# Patient Record
Sex: MALE | URBAN - METROPOLITAN AREA
[De-identification: names, ages, dates, MRNs, and addresses within clinical notes are randomized per-mention and may not be internally consistent; named-entity substitution may affect disease eponyms.]

---

## 2020-03-09 ENCOUNTER — HOSPITAL ENCOUNTER (OUTPATIENT)
Age: 65
Setting detail: SPECIMEN
Discharge: HOME | End: 2020-03-09
Payer: COMMERCIAL

## 2020-03-13 LAB — SURGICAL PATHOLOGY REPORT: NORMAL

## 2024-03-20 ENCOUNTER — ANESTHESIA EVENT (OUTPATIENT)
Dept: OPERATING ROOM | Age: 69
End: 2024-03-20
Payer: COMMERCIAL

## 2024-03-20 ENCOUNTER — HOSPITAL ENCOUNTER (OUTPATIENT)
Age: 69
Setting detail: OUTPATIENT SURGERY
Discharge: HOME | End: 2024-03-20
Attending: SURGERY | Admitting: SURGERY
Payer: COMMERCIAL

## 2024-03-20 ENCOUNTER — ANESTHESIA (OUTPATIENT)
Dept: OPERATING ROOM | Age: 69
End: 2024-03-20
Payer: COMMERCIAL

## 2024-03-20 VITALS
HEART RATE: 56 BPM | BODY MASS INDEX: 30.22 KG/M2 | RESPIRATION RATE: 11 BRPM | OXYGEN SATURATION: 98 % | TEMPERATURE: 96.8 F | WEIGHT: 188 LBS | SYSTOLIC BLOOD PRESSURE: 110 MMHG | HEIGHT: 66 IN | DIASTOLIC BLOOD PRESSURE: 67 MMHG

## 2024-03-20 DIAGNOSIS — Z12.11 SCREEN FOR COLON CANCER: ICD-10-CM

## 2024-03-20 PROBLEM — I10 PRIMARY HYPERTENSION: Status: ACTIVE | Noted: 2024-03-20

## 2024-03-20 PROBLEM — Z86.010 HISTORY OF COLON POLYPS: Status: ACTIVE | Noted: 2024-03-20

## 2024-03-20 LAB — GLUCOSE BLD-MCNC: 94 MG/DL (ref 75–110)

## 2024-03-20 PROCEDURE — 2709999900 HC NON-CHARGEABLE SUPPLY: Performed by: SURGERY

## 2024-03-20 PROCEDURE — 3700000000 HC ANESTHESIA ATTENDED CARE: Performed by: SURGERY

## 2024-03-20 PROCEDURE — 2500000003 HC RX 250 WO HCPCS: Performed by: SPECIALIST

## 2024-03-20 PROCEDURE — 2580000003 HC RX 258: Performed by: ANESTHESIOLOGY

## 2024-03-20 PROCEDURE — 6360000002 HC RX W HCPCS: Performed by: ANESTHESIOLOGY

## 2024-03-20 PROCEDURE — C1889 IMPLANT/INSERT DEVICE, NOC: HCPCS | Performed by: SURGERY

## 2024-03-20 PROCEDURE — 3609027000 HC COLONOSCOPY: Performed by: SURGERY

## 2024-03-20 PROCEDURE — 82947 ASSAY GLUCOSE BLOOD QUANT: CPT

## 2024-03-20 PROCEDURE — 6360000002 HC RX W HCPCS: Performed by: SPECIALIST

## 2024-03-20 PROCEDURE — 3700000001 HC ADD 15 MINUTES (ANESTHESIA): Performed by: SURGERY

## 2024-03-20 PROCEDURE — 88305 TISSUE EXAM BY PATHOLOGIST: CPT

## 2024-03-20 PROCEDURE — 7100000010 HC PHASE II RECOVERY - FIRST 15 MIN: Performed by: SURGERY

## 2024-03-20 PROCEDURE — 7100000011 HC PHASE II RECOVERY - ADDTL 15 MIN: Performed by: SURGERY

## 2024-03-20 DEVICE — WORKING LENGTH 235CM, WORKING CHANNEL 2.8MM
Type: IMPLANTABLE DEVICE | Status: FUNCTIONAL
Brand: RESOLUTION 360 CLIP

## 2024-03-20 RX ORDER — SODIUM CHLORIDE 9 MG/ML
INJECTION, SOLUTION INTRAVENOUS CONTINUOUS
Status: DISCONTINUED | OUTPATIENT
Start: 2024-03-20 | End: 2024-03-20 | Stop reason: HOSPADM

## 2024-03-20 RX ORDER — ASPIRIN 81 MG/1
81 TABLET ORAL DAILY
COMMUNITY

## 2024-03-20 RX ORDER — VALSARTAN AND HYDROCHLOROTHIAZIDE 320; 12.5 MG/1; MG/1
1 TABLET, FILM COATED ORAL EVERY MORNING
COMMUNITY
Start: 2017-02-24

## 2024-03-20 RX ORDER — LIDOCAINE HYDROCHLORIDE 10 MG/ML
1 INJECTION, SOLUTION EPIDURAL; INFILTRATION; INTRACAUDAL; PERINEURAL
Status: DISCONTINUED | OUTPATIENT
Start: 2024-03-20 | End: 2024-03-20 | Stop reason: HOSPADM

## 2024-03-20 RX ORDER — SODIUM CHLORIDE, SODIUM LACTATE, POTASSIUM CHLORIDE, CALCIUM CHLORIDE 600; 310; 30; 20 MG/100ML; MG/100ML; MG/100ML; MG/100ML
INJECTION, SOLUTION INTRAVENOUS CONTINUOUS
Status: DISCONTINUED | OUTPATIENT
Start: 2024-03-20 | End: 2024-03-20 | Stop reason: HOSPADM

## 2024-03-20 RX ORDER — ONDANSETRON 2 MG/ML
4 INJECTION INTRAMUSCULAR; INTRAVENOUS ONCE
Status: COMPLETED | OUTPATIENT
Start: 2024-03-20 | End: 2024-03-20

## 2024-03-20 RX ORDER — ATORVASTATIN CALCIUM 20 MG/1
20 TABLET, FILM COATED ORAL DAILY
COMMUNITY
Start: 2017-03-11

## 2024-03-20 RX ORDER — PROPOFOL 10 MG/ML
INJECTION, EMULSION INTRAVENOUS PRN
Status: DISCONTINUED | OUTPATIENT
Start: 2024-03-20 | End: 2024-03-20 | Stop reason: SDUPTHER

## 2024-03-20 RX ORDER — ACYCLOVIR 400 MG/1
400 TABLET ORAL DAILY
COMMUNITY
Start: 2017-04-29

## 2024-03-20 RX ORDER — SODIUM CHLORIDE 0.9 % (FLUSH) 0.9 %
5-40 SYRINGE (ML) INJECTION PRN
Status: DISCONTINUED | OUTPATIENT
Start: 2024-03-20 | End: 2024-03-20 | Stop reason: HOSPADM

## 2024-03-20 RX ORDER — SODIUM CHLORIDE 0.9 % (FLUSH) 0.9 %
5-40 SYRINGE (ML) INJECTION EVERY 12 HOURS SCHEDULED
Status: DISCONTINUED | OUTPATIENT
Start: 2024-03-20 | End: 2024-03-20 | Stop reason: HOSPADM

## 2024-03-20 RX ORDER — CETIRIZINE HYDROCHLORIDE 5 MG/1
5 TABLET ORAL DAILY
COMMUNITY

## 2024-03-20 RX ORDER — LIDOCAINE HYDROCHLORIDE 20 MG/ML
INJECTION, SOLUTION EPIDURAL; INFILTRATION; INTRACAUDAL; PERINEURAL PRN
Status: DISCONTINUED | OUTPATIENT
Start: 2024-03-20 | End: 2024-03-20 | Stop reason: SDUPTHER

## 2024-03-20 RX ORDER — SODIUM CHLORIDE 9 MG/ML
INJECTION, SOLUTION INTRAVENOUS PRN
Status: DISCONTINUED | OUTPATIENT
Start: 2024-03-20 | End: 2024-03-20 | Stop reason: HOSPADM

## 2024-03-20 RX ORDER — OLOPATADINE HYDROCHLORIDE 1 MG/ML
1 SOLUTION/ DROPS OPHTHALMIC 2 TIMES DAILY PRN
COMMUNITY
Start: 2022-06-18

## 2024-03-20 RX ADMIN — PROPOFOL 50 MG: 10 INJECTION, EMULSION INTRAVENOUS at 13:25

## 2024-03-20 RX ADMIN — PROPOFOL 50 MG: 10 INJECTION, EMULSION INTRAVENOUS at 13:47

## 2024-03-20 RX ADMIN — PROPOFOL 150 MG: 10 INJECTION, EMULSION INTRAVENOUS at 13:18

## 2024-03-20 RX ADMIN — LIDOCAINE HYDROCHLORIDE 100 MG: 20 INJECTION, SOLUTION EPIDURAL; INFILTRATION; INTRACAUDAL; PERINEURAL at 13:18

## 2024-03-20 RX ADMIN — PROPOFOL 50 MG: 10 INJECTION, EMULSION INTRAVENOUS at 13:34

## 2024-03-20 RX ADMIN — ONDANSETRON 4 MG: 2 INJECTION INTRAMUSCULAR; INTRAVENOUS at 12:52

## 2024-03-20 RX ADMIN — SODIUM CHLORIDE, POTASSIUM CHLORIDE, SODIUM LACTATE AND CALCIUM CHLORIDE: 600; 310; 30; 20 INJECTION, SOLUTION INTRAVENOUS at 12:47

## 2024-03-20 RX ADMIN — PROPOFOL 50 MG: 10 INJECTION, EMULSION INTRAVENOUS at 13:40

## 2024-03-20 RX ADMIN — PROPOFOL 50 MG: 10 INJECTION, EMULSION INTRAVENOUS at 13:30

## 2024-03-20 RX ADMIN — PROPOFOL 50 MG: 10 INJECTION, EMULSION INTRAVENOUS at 13:52

## 2024-03-20 ASSESSMENT — ENCOUNTER SYMPTOMS
COLOR CHANGE: 0
ABDOMINAL DISTENTION: 0
CONSTIPATION: 0
EYE DISCHARGE: 0
SHORTNESS OF BREATH: 0
CHOKING: 0
APNEA: 0
ANAL BLEEDING: 0
EYE PAIN: 0
PHOTOPHOBIA: 0

## 2024-03-20 ASSESSMENT — PAIN - FUNCTIONAL ASSESSMENT
PAIN_FUNCTIONAL_ASSESSMENT: FACE, LEGS, ACTIVITY, CRY, AND CONSOLABILITY (FLACC)
PAIN_FUNCTIONAL_ASSESSMENT: 0-10

## 2024-03-20 NOTE — OP NOTE
Operative Note      Patient: Lul Jolly  YOB: 1955  MRN: [id number]    Date of Procedure: 3/20/2024    Pre-Op Diagnosis Codes:     * Screen for colon cancer [Z12.11], Hx of colon polyps    Post-Op Diagnosis: Same and pandiverticulosis, 3x colon polyps       Procedure(s):  COLONOSCOPY, COLD SNARE POLYPECTOMY, CLIP X1    Surgeon(s):  Vladislav Dent DO    Assistant:   Resident: Elio Linda DO    Anesthesia: Monitor Anesthesia Care    Estimated Blood Loss (mL): 2ml    Complications: None    Specimens:   ID Type Source Tests Collected by Time Destination   A : ASCENDING COLON POLYP Tissue Colon-Ascending SURGICAL PATHOLOGY Vladislav Dent, DO 3/20/2024 1329    B : DESCENDING COLON Tissue Colon-Descending SURGICAL PATHOLOGY Vladislav Dent, DO 3/20/2024 1350        Implants:  Implant Name Type Inv. Item Serial No.  Lot No. LRB No. Used Action   CLIP ENDOSCP 235CM RESOL 360 ORDER UOM IS EACH - [id number]  CLIP ENDOSCP 235CM RESOL 360 ORDER UOM IS EACH  [id number]- [id number] N/A 1 Implanted         Drains: none    Findings: 2x semi-pedunculated ascending colon polyps <1cm taken w/ cold snare, application of 1x resolution clip to proximal colon polyp resection site, 1x descending semi-pedunculated colon polyp <5mm taken w/ cold snare, pandiverticulosis primarily in sigmoid 20-50cm from anus    Indication: Patient is a 68y male w/ history of colon polyps here for screening colonoscopy. Consent confirmed in chart, no AC/AP use, plan to proceed.    Detailed Description of Procedure:     The patient was given monitored anesthesia care. The patient was given oxygen by nasal cannula. A time out was performed ensuring the proper patient, procedure, position, antibiotics if needed, and acknowledging allergies and consent present in chart. An external anal exam and KATHYA were performed noting no masses, bleeding or strictures. The colonoscope was inserted per rectum and advanced under direct

## 2024-03-20 NOTE — ANESTHESIA PRE PROCEDURE
Department of Anesthesiology  Preprocedure Note       Name:  Lul Jolly   Age:  68 y.o.  :  1955                                          MRN:  [id number]         Date:  3/20/2024      Surgeon: Surgeon(s):  Vladislav Dent DO    Procedure: Procedure(s):  COLORECTAL CANCER SCREENING, NOT HIGH RISK    Medications prior to admission:   Prior to Admission medications    Medication Sig Start Date End Date Taking? Authorizing Provider   atorvastatin (LIPITOR) 20 MG tablet Take 1 tablet by mouth daily 3/11/17  Yes ProviderSandra MD   valsartan-hydroCHLOROthiazide (DIOVAN-HCT) 320-12.5 MG per tablet Take 1 tablet by mouth every morning 17  Yes ProviderSandra MD   olopatadine (PATANOL) 0.1 % ophthalmic solution Apply 1 drop to eye 2 times daily as needed 22  Yes ProviderSandra MD   acyclovir (ZOVIRAX) 400 MG tablet Take 1 tablet by mouth daily 17  Yes Sandra Camacho MD   cetirizine (ZYRTEC) 5 MG tablet Take 1 tablet by mouth daily   Yes Provider, MD Sandra   aspirin 81 MG EC tablet Take 1 tablet by mouth daily    Provider, MD Sandra       Current medications:    Current Facility-Administered Medications   Medication Dose Route Frequency Provider Last Rate Last Admin   • lidocaine PF 1 % injection 1 mL  1 mL IntraDERmal Once PRN Luiz Durand, DO       • 0.9 % sodium chloride infusion   IntraVENous Continuous Luiz Durand, DO       • lactated ringers IV soln infusion   IntraVENous Continuous Luiz Durand, DO       • sodium chloride flush 0.9 % injection 5-40 mL  5-40 mL IntraVENous 2 times per day Luiz Durand, DO       • sodium chloride flush 0.9 % injection 5-40 mL  5-40 mL IntraVENous PRN Luiz Durand, DO       • 0.9 % sodium chloride infusion   IntraVENous PRN Luiz Durand, DO           Allergies:  No Known Allergies    Problem List:    Patient Active Problem List   Diagnosis Code   • History of colon polyps Z86.010   •

## 2024-03-20 NOTE — H&P
General Surgery  H&P        PATIENT NAME: Lul Jolly   YOB: 1955    ADMISSION DATE: No admission date for patient encounter.     Admitting Physician: Dr Dent     TODAY'S DATE: 3/20/2024    Reason for admission: Surveillance colonoscopy    Chief complaint: Same    HISTORY OF PRESENT ILLNESS:  The patient is a 68 y.o. male  who presents with history of colon polyps for surveillance colonoscopy    Past Medical History: Hyperlipidemia and hypertension  No past medical history on file.    Past Surgical History:    No past surgical history on file.    Medications Prior to Admission: See epic list  No medications prior to admission.    Allergies:  Patient has no allergy information on record.    Social History:   Social History     Socioeconomic History    Marital status: Unknown     Spouse name: Not on file    Number of children: Not on file    Years of education: Not on file    Highest education level: Not on file   Occupational History    Not on file   Tobacco Use    Smoking status: Not on file    Smokeless tobacco: Not on file   Substance and Sexual Activity    Alcohol use: Not on file    Drug use: Not on file    Sexual activity: Not on file   Other Topics Concern    Not on file   Social History Narrative    Not on file     Social Determinants of Health     Financial Resource Strain: Not on file   Food Insecurity: Not on file   Transportation Needs: Not on file   Physical Activity: Not on file   Stress: Not on file   Social Connections: Not on file   Intimate Partner Violence: Not on file   Housing Stability: Not on file       Family History:   No family history on file.    REVIEW OF SYSTEMS:    Review of Systems   Constitutional:  Negative for activity change, chills and fatigue.   HENT:  Negative for congestion, drooling and ear pain.    Eyes:  Negative for photophobia, pain and discharge.   Respiratory:  Negative for apnea, choking and shortness of breath.    Cardiovascular:  Negative for chest pain,

## 2024-03-20 NOTE — ANESTHESIA POSTPROCEDURE EVALUATION
Department of Anesthesiology  Postprocedure Note    Patient: Lul Jolly  MRN: [id number]  YOB: 1955  Date of evaluation: 3/20/2024    Procedure Summary       Date: 03/20/24 Room / Location: [address]    Anesthesia Start: 1316 Anesthesia Stop: 1409    Procedure: COLONOSCOPY, COLD SNARE POLYPECTOMY, CLIP X1 Diagnosis:       Screen for colon cancer      (Screen for colon cancer [Z12.11])    Surgeons: Vladislav Dent DO Responsible Provider: Lee Lux MD    Anesthesia Type: MAC ASA Status: 2            Anesthesia Type: No value filed.    Fredi Phase I: Fredi Score: 10    Fredi Phase II: Fredi Score: 10    Anesthesia Post Evaluation    Cardiovascular status: hemodynamically stable    No notable events documented.

## 2024-03-22 LAB — SURGICAL PATHOLOGY REPORT: NORMAL

## (undated) DEVICE — SNARE ENDOSCP ROUNDED 2.4X20X240 MM STIFF CAPTIVATOR II

## (undated) DEVICE — STAZ ENDO KIT: Brand: MEDLINE INDUSTRIES, INC.

## (undated) DEVICE — GOWN,AURORA,NONREINFORCED,LARGE: Brand: MEDLINE

## (undated) DEVICE — TRAP SURG QUAD PARABOLA SLOT DSGN SFTY SCRN TRAPEASE